# Patient Record
Sex: FEMALE | Race: BLACK OR AFRICAN AMERICAN | URBAN - METROPOLITAN AREA
[De-identification: names, ages, dates, MRNs, and addresses within clinical notes are randomized per-mention and may not be internally consistent; named-entity substitution may affect disease eponyms.]

---

## 2024-01-29 NOTE — PROGRESS NOTES
Patient: Raoul Mcfadden                MRN: [id number]       SSN: [id number]  YOB: 1955        AGE: 68 y.o.        SEX: female      PCP: Vero Toussaint MD  02/14/24    Chief Complaint   Patient presents with    Knee Pain     bilateral     HISTORY:  Raoul Mcfadden is a 68 y.o. female seen for few year history of  bilateral knee pain (R>L).  She denies any recent injury.  She feels pain with standing, walking and stair climbing.  She experiences startup pain after sitting. She avoids doing activities that require her to kneel. She experiences cracking and popping in her knees. She responded to previous cortisone injections.     Occupation, etc: Ms. Mcfadden  is retired. 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 She lives with her  in [place]. She has 10 adult children that live in [place], [place] and 25 grandchildren. She has a spreadsheet of all her children and grandchildren to keep track of birthdays. She participates in water aerobics and yoga.   Wt Readings from Last 3 Encounters:   02/14/24 65.3 kg (144 lb)   01/20/23 65.3 kg (144 lb)   12/06/22 65.3 kg (144 lb)      Body mass index is 28.12 kg/m².    There is no problem list on file for this patient.      Social History     Tobacco Use    Smoking status: Never    Smokeless tobacco: Never   Substance Use Topics    Alcohol use: Never    Drug use: Never        Not on File     No current outpatient medications on file.     No current facility-administered medications for this visit.        PHYSICAL EXAMINATION:  Temp 97.8 °F (36.6 °C) (Temporal)   Ht 1.524 m (5')   Wt 65.3 kg (144 lb)   BMI 28.12 kg/m²      ORTHO EXAMINATION:  Examination Right knee Left knee   Skin Intact Intact   Range of motion 115-0 115-0   Effusion - -   Medial joint line tenderness + +   Lateral joint line tenderness - -   Popliteal tenderness - -   Osteophytes palpable + +

## 2024-02-14 ENCOUNTER — OFFICE VISIT (OUTPATIENT)
Age: 69
End: 2024-02-14
Payer: MEDICARE

## 2024-02-14 VITALS — WEIGHT: 144 LBS | TEMPERATURE: 97.8 F | HEIGHT: 60 IN | BODY MASS INDEX: 28.27 KG/M2

## 2024-02-14 DIAGNOSIS — G89.29 CHRONIC PAIN OF LEFT KNEE: ICD-10-CM

## 2024-02-14 DIAGNOSIS — M25.562 CHRONIC PAIN OF LEFT KNEE: ICD-10-CM

## 2024-02-14 DIAGNOSIS — M25.561 CHRONIC PAIN OF RIGHT KNEE: Primary | ICD-10-CM

## 2024-02-14 DIAGNOSIS — M22.42 CHONDROMALACIA, PATELLA, LEFT: ICD-10-CM

## 2024-02-14 DIAGNOSIS — G89.29 CHRONIC PAIN OF RIGHT KNEE: Primary | ICD-10-CM

## 2024-02-14 DIAGNOSIS — M22.41 CHONDROMALACIA, PATELLA, RIGHT: ICD-10-CM

## 2024-02-14 PROCEDURE — 99203 OFFICE O/P NEW LOW 30 MIN: CPT | Performed by: SPECIALIST

## 2024-02-14 PROCEDURE — 73562 X-RAY EXAM OF KNEE 3: CPT | Performed by: SPECIALIST

## 2024-02-14 PROCEDURE — 1123F ACP DISCUSS/DSCN MKR DOCD: CPT | Performed by: SPECIALIST

## 2024-03-18 ENCOUNTER — HOSPITAL ENCOUNTER (OUTPATIENT)
Facility: HOSPITAL | Age: 69
Setting detail: RECURRING SERIES
Discharge: HOME | End: 2024-03-21
Payer: MEDICARE

## 2024-03-18 PROCEDURE — 97110 THERAPEUTIC EXERCISES: CPT

## 2024-03-18 PROCEDURE — 97161 PT EVAL LOW COMPLEX 20 MIN: CPT

## 2024-03-18 NOTE — THERAPY EVALUATION
[facility]  [address] [address] Ph: [phone] Fx: [phone]  Plan of Care / Statement of Necessity for Physical Therapy Services     Patient Name: Raoul Mcfadden : 1955   Medical   Diagnosis: Left knee pain [M25.562]  Right knee pain [M25.561] Treatment Diagnosis:   M25.561  RIGHT KNEE PAIN and M25.562  LEFT KNEE PAIN     Onset Date:      Referral Source: Lonnie Chiu MD Start of Care (SOC): 3/18/2024   Prior Hospitalization: See medical history Provider #: [id number]   Prior Level of Function: Previous less pain after prolonged ambulation, no difficulty with rising from chair   Comorbidities: Asthma, back pain, GERD, HTN, incontinence, osteoporosis, visual impaired     Assessment / gilliam information:  Raoul Mcfadden is a 68 y.o.  yo female with Dx: right/left knee pain, who reports having knee pain approx 10 years of insidious onset.  Imaging has confirm arthritis.  Pt rates pain as 8/10 max, 1/10 at best, 4/10 today, located at bilat ant knees and increases after prolonged ambulation.  Prior treatment includes topical analgesics, knee sleeves, new shoes as well as water aerobics and yoga.  Reports diff with control a deep squat in yoga.  Objective: FOTO score = 55 (an established functional score where 100 = no disability).  Palpation: no tenderness reported at bilat knees  Strength: reduced 4-/5 right/left hip abd, ext; some diff with lateral eccentric step down on 6 inch box bialt; able to do supine bridge for 10 sec without difficulty  ROM: AAROM knee right 0-136; left 0-134; noted crepitus with knee ext bialt  Special Tests: SL bal > 10 sec bilat   Pt instructed in HEP and will f/u in clinic for PT.     Not post operative    Evaluation Complexity:  History:  MEDIUM  Complexity : 1-2 comorbidities / personal factors will impact the outcome/ POC ; Examination:  HIGH Complexity : 4+ Standardized tests and measures

## 2024-03-18 NOTE — PROGRESS NOTES
PHYSICAL / OCCUPATIONAL THERAPY - DAILY TREATMENT NOTE (updated )  For Eval visit    Patient Name: Raoul Mcfadden    Date: 3/18/2024    : 1955  Insurance: Payor: [employer] / Plan: [place] / Product Type: *No Product type* /      Patient  verified yes     Visit #   Current / Total 1 16   Time   In / Out 1:20 1:49   Pain   In / Out 4/10 4/10   Subjective Functional Status/Changes: See POC     TREATMENT AREA =  see POC    OBJECTIVE         20 min   Eval - untimed                      Therapeutic Procedures:    Tx Min Billable or 1:1 Min (if diff from Tx Min) Procedure, Rationale, Specifics   9  32834 Therapeutic Exercise (timed):  increase ROM, strength, coordination, balance, and proprioception to improve patient's ability to progress to PLOF and address remaining functional goals. (see flow sheet as applicable)     Details if applicable:              Details if applicable:            Details if applicable:            Details if applicable:            Details if applicable:       [facility] Totals Reminder: bill using total billable min of TIMED therapeutic procedures (example: do not include dry needle or estim unattended, both untimed codes, in totals to left)  8-22 min = 1 unit; 23-37 min = 2 units; 38-52 min = 3 units; 53-67 min = 4 units; 68-82 min = 5 units   Total Total     [x]  Patient Education billed concurrently with other procedures   [x] Review HEP    [] Progressed/Changed HEP, detail:    [] Other detail:       Objective Information/Functional Measures/Assessment    See POC    Patient will continue to benefit from skilled PT / OT services to modify and progress therapeutic interventions, analyze and address strength deficits, analyze and address soft tissue restrictions, and analyze and cue for proper movement patterns to address functional deficits and attain remaining goals.    Progress toward goals / Updated goals:  [x]  See POC    Short Term Goals: To be accomplished

## 2024-03-19 ENCOUNTER — TELEPHONE (OUTPATIENT)
Facility: HOSPITAL | Age: 69
End: 2024-03-19

## 2024-03-19 NOTE — TELEPHONE ENCOUNTER
JOSEM to inform pt that we had received for her pt visits. Asked pt to call the clinic back to get scheduled.

## 2024-04-01 ENCOUNTER — HOSPITAL ENCOUNTER (OUTPATIENT)
Facility: HOSPITAL | Age: 69
Setting detail: RECURRING SERIES
Discharge: HOME | End: 2024-04-04
Payer: MEDICARE

## 2024-04-01 PROCEDURE — 97112 NEUROMUSCULAR REEDUCATION: CPT

## 2024-04-01 PROCEDURE — 97530 THERAPEUTIC ACTIVITIES: CPT

## 2024-04-01 PROCEDURE — 97110 THERAPEUTIC EXERCISES: CPT

## 2024-04-01 NOTE — PROGRESS NOTES
PHYSICAL / OCCUPATIONAL THERAPY - DAILY TREATMENT NOTE    Patient Name: Raoul Mcfadden    Date: 2024    : 1955  Insurance: Payor: [employer] / Plan: [place] / Product Type: *No Product type* /      Patient  verified Yes     Visit #   Current / Total 2 16   Time   In / Out 140 230   Pain   In / Out 2-3 0   Subjective Functional Status/Changes: Patient states she continues to feel pain and ach in her knees.      TREATMENT AREA =  Left knee pain [M25.562]  Right knee pain [M25.561]    OBJECTIVE    Ice (UNBILLED):  location/position: sitting; B knee     Min Rationale   10 decrease pain to improve patient's ability to progress to PLOF and address remaining functional goals.     Skin assessment post-treatment:   Intact     Therapeutic Procedures:    55131 Therapeutic Exercise (timed):  increase ROM, strength, coordination, balance, and proprioception to improve patient's ability to progress to PLOF and address remaining functional goals.   Tx Min Billable or 1:1 Min   (if diff from Tx Min) Details:   15  See flow sheet as applicable     27641 Neuromuscular Re-Education (timed):  improve balance, coordination, kinesthetic sense, posture, core stability and proprioception to improve patient's ability to develop conscious control of individual muscles and awareness of position of extremities in order to progress to PLOF and address remaining functional goals.   Tx Min Billable or 1:1 Min   (if diff from Tx Min) Details:   15  See flow sheet as applicable     03661 Therapeutic Activity (timed):  use of dynamic activities replicating functional movements to increase ROM, strength, coordination, balance, and proprioception in order to improve patient's ability to progress to PLOF and address remaining functional goals.   Tx Min Billable or 1:1 Min   (if diff from Tx Min) Details:   10  See flow sheet as applicable       40   BC Totals Reminder: bill using total billable min of TIMED

## 2024-04-08 ENCOUNTER — HOSPITAL ENCOUNTER (OUTPATIENT)
Facility: HOSPITAL | Age: 69
Setting detail: RECURRING SERIES
Discharge: HOME | End: 2024-04-11
Payer: MEDICARE

## 2024-04-08 PROCEDURE — 97112 NEUROMUSCULAR REEDUCATION: CPT

## 2024-04-08 PROCEDURE — 97110 THERAPEUTIC EXERCISES: CPT

## 2024-04-08 PROCEDURE — 97530 THERAPEUTIC ACTIVITIES: CPT

## 2024-04-08 NOTE — PROGRESS NOTES
[facility]  [address] [address] Ph: [phone] Fx: [phone]  PHYSICAL THERAPY PROGRESS NOTE  Patient Name: Raoul Mcfadden : 1955   Treatment/Medical Diagnosis: Left knee pain [M25.562]  Right knee pain [M25.561]   Referral Source: Lonnie Chiu MD     Date of Initial Visit: 3/18/24 Attended Visits: 3 Missed Visits: 0     SUMMARY OF TREATMENT  Patient has attended two follow up sessions since evaluation on 3/18/24. Patient has progressed with having decreased pain after ambulating 30 mins. Patient continues to demonstrate B hip weakness.     CURRENT STATUS    Short Term Goals: To be accomplished in  3-4weeks:  1. Independent with HEP.  EVAL: NA  Current: MET: pt reports performing HEP 24  2. Decrease max pain 25-50% to assist with prolonged amb > 30 min.  EVAL: pain 8/10 max  Current: MET: patient reports 0/10 pain with 30 mins ambulation. 24  3. Improve bilat hip abd, hip ext strength to 4+/5 to assist with prolonged standing/ambulation > 30 min.  EVAL: MMT bilat hip abd, hip ext 4-/5  Current: remains MMT bilat hip abd, hip ext 4-/5 24  Long Term Goals: To be accomplished in  6-8  weeks:  1.  Decrease max pain 50-75% to assist with prolonged amb > 30 min.  EVAL: pain 8/10 max  Current: MET: pain 1/10 after 30 mins of ambulation.   2.  Improve FOTO Functional Status Score by 7 points in order to show significant functional improvement.  EVAL: FOTO = 55  Current: MET: FOTO = 67. 24  3.  Will be able to perform 2 sets 10 reps standing squat to chair with good control and limited fatigue to show improved LE control and improved transfer safety.  EVAL:  difficulty with control on standing squat and fatigue after 8 reps.  Current: progressing: patient able to perform 10 sit to stands without increase in pain. 24      Payor: [employer] MEDICARE / Plan: VINNIE [facility] MEDICARE / Product Type: *No Product type* /

## 2024-04-08 NOTE — PROGRESS NOTES
PHYSICAL / OCCUPATIONAL THERAPY - DAILY TREATMENT NOTE    Patient Name: Raoul Mcfadden    Date: 2024    : 1955  Insurance: Payor: [employer] / Plan: [place] / Product Type: *No Product type* /      Patient  verified Yes     Visit #   Current / Total 3 16   Time   In / Out 148 244   Pain   In / Out 2 0   Subjective Functional Status/Changes: Patient states that she has increased pain in her knees this time of year due to it being gardening season.      TREATMENT AREA =  Left knee pain [M25.562]  Right knee pain [M25.561]    OBJECTIVE      Ice (UNBILLED):  location/position: sitting; B knee     Min Rationale   10 decrease pain to improve patient's ability to progress to PLOF and address remaining functional goals.     Skin assessment post-treatment:   Intact      Therapeutic Procedures:     15088 Therapeutic Exercise (timed):  increase ROM, strength, coordination, balance, and proprioception to improve patient's ability to progress to PLOF and address remaining functional goals.   Tx Min Billable or 1:1 Min   (if diff from Tx Min) Details:   21 15  See flow sheet as applicable      20321 Neuromuscular Re-Education (timed):  improve balance, coordination, kinesthetic sense, posture, core stability and proprioception to improve patient's ability to develop conscious control of individual muscles and awareness of position of extremities in order to progress to PLOF and address remaining functional goals.   Tx Min Billable or 1:1 Min   (if diff from Tx Min) Details:   15 15  See flow sheet as applicable      60084 Therapeutic Activity (timed):  use of dynamic activities replicating functional movements to increase ROM, strength, coordination, balance, and proprioception in order to improve patient's ability to progress to PLOF and address remaining functional goals.   Tx Min Billable or 1:1 Min   (if diff from Tx Min) Details:   10 8  See flow sheet as applicable         46 38  [facility]

## 2024-04-29 ENCOUNTER — HOSPITAL ENCOUNTER (OUTPATIENT)
Facility: HOSPITAL | Age: 69
Setting detail: RECURRING SERIES
Discharge: HOME | End: 2024-05-02
Payer: MEDICARE

## 2024-04-29 PROCEDURE — 97530 THERAPEUTIC ACTIVITIES: CPT

## 2024-04-29 PROCEDURE — 97112 NEUROMUSCULAR REEDUCATION: CPT

## 2024-04-29 PROCEDURE — 97110 THERAPEUTIC EXERCISES: CPT

## 2024-04-29 NOTE — PROGRESS NOTES
PHYSICAL / OCCUPATIONAL THERAPY - DAILY TREATMENT NOTE    Patient Name: Raoul Mcfadden    Date: 2024    : 1955  Insurance: Payor: [employer] MEDICARE / [place]: [place] / Product Type: *No Product type* /      Patient  verified Yes     Visit #   Current / Total 4 16   Time   In / Out 140 235   Pain   In / Out 0 0   Subjective Functional Status/Changes: Patient states she has been traveling for the past two weeks and she has not been compliant with her home exercises.      TREATMENT AREA =  Left knee pain [M25.562]  Right knee pain [M25.561]    OBJECTIVE        Ice (UNBILLED):  location/position: sitting; B knee     Min Rationale   10 decrease pain to improve patient's ability to progress to PLOF and address remaining functional goals.     Skin assessment post-treatment:   Intact      Therapeutic Procedures:     69998 Therapeutic Exercise (timed):  increase ROM, strength, coordination, balance, and proprioception to improve patient's ability to progress to PLOF and address remaining functional goals.   Tx Min Billable or 1:1 Min   (if diff from Tx Min) Details:   21  See flow sheet as applicable      84403 Neuromuscular Re-Education (timed):  improve balance, coordination, kinesthetic sense, posture, core stability and proprioception to improve patient's ability to develop conscious control of individual muscles and awareness of position of extremities in order to progress to PLOF and address remaining functional goals.   Tx Min Billable or 1:1 Min   (if diff from Tx Min) Details:   15  See flow sheet as applicable      33738 Therapeutic Activity (timed):  use of dynamic activities replicating functional movements to increase ROM, strength, coordination, balance, and proprioception in order to improve patient's ability to progress to PLOF and address remaining functional goals.   Tx Min Billable or 1:1 Min   (if diff from Tx Min) Details:   9  See flow sheet as applicable         45  [facility]

## 2024-05-09 ENCOUNTER — HOSPITAL ENCOUNTER (OUTPATIENT)
Facility: HOSPITAL | Age: 69
Setting detail: RECURRING SERIES
Discharge: HOME | End: 2024-05-12
Payer: MEDICARE

## 2024-05-09 PROCEDURE — 97110 THERAPEUTIC EXERCISES: CPT

## 2024-05-09 PROCEDURE — 97530 THERAPEUTIC ACTIVITIES: CPT

## 2024-05-09 PROCEDURE — 97112 NEUROMUSCULAR REEDUCATION: CPT

## 2024-05-09 PROCEDURE — 97535 SELF CARE MNGMENT TRAINING: CPT

## 2024-05-09 NOTE — PROGRESS NOTES
PHYSICAL / OCCUPATIONAL THERAPY - DAILY TREATMENT NOTE    Patient Name: Raoul Mcfadden    Date: 2024    : 1955  Insurance: Payor: [employer] MEDICARE / [place]: [place] / Product Type: *No Product type* /      Patient  verified Yes     Visit #   Current / Total 5 16   Time   In / Out 216 319   Pain   In / Out 0 0   Subjective Functional Status/Changes: Functional Gains: resumed TM walking  Functional Deficits: none at this time  % improvement: 98%  Pain   Average: 0/10       Best: 0/10     Worst: 0/10  Patient Goal: \"to D/C\"       TREATMENT AREA =  Left knee pain [M25.562]  Right knee pain [M25.561]    OBJECTIVE        Ice (UNBILLED):  location/position: supine with wedge under LEs; B knee     Min Rationale   10 decrease pain to improve patient's ability to progress to PLOF and address remaining functional goals.     Skin assessment post-treatment:   Intact      Therapeutic Procedures:   31727 Therapeutic Exercise (timed):  increase ROM, strength, coordination, balance, and proprioception to improve patient's ability to progress to PLOF and address remaining functional goals.   Tx Min Billable or 1:1 Min   (if diff from Tx Min) Details:   20 20 See flow sheet as applicable     88096 Neuromuscular Re-Education (timed):  improve balance, coordination, kinesthetic sense, posture, core stability and proprioception to improve patient's ability to develop conscious control of individual muscles and awareness of position of extremities in order to progress to PLOF and address remaining functional goals.   Tx Min Billable or 1:1 Min   (if diff from Tx Min) Details:   8 8 See flow sheet as applicable; D/C HEP review      51061 Therapeutic Activity (timed):  use of dynamic activities replicating functional movements to increase ROM, strength, coordination, balance, and proprioception in order to improve patient's ability to progress to PLOF and address remaining functional goals.   Tx Min Billable or 
form.      Medicare: Reporting Period (date from last assessment to current assessment): 4/8/24 to 5/9/24    RECOMMENDATIONS  Discontinue therapy. Progressing towards or have reached established goals.    If you have any questions/comments please contact us directly at [phone].   Thank you for allowing us to assist in the care of your patient.    Teodora Cunningham, PTA       5/9/2024       3:11 PM  Cosigned by Maris Parr, PT     5/13/2024     10:00 AM       Not Medicaid Ins, no signature required for DC

## 2024-05-13 ENCOUNTER — APPOINTMENT (OUTPATIENT)
Facility: HOSPITAL | Age: 69
End: 2024-05-13
Payer: MEDICARE